# Patient Record
Sex: FEMALE | Race: WHITE
[De-identification: names, ages, dates, MRNs, and addresses within clinical notes are randomized per-mention and may not be internally consistent; named-entity substitution may affect disease eponyms.]

---

## 2020-01-24 ENCOUNTER — HOSPITAL ENCOUNTER (OUTPATIENT)
Dept: HOSPITAL 47 - RADCTMAIN | Age: 65
Discharge: HOME | End: 2020-01-24
Attending: INTERNAL MEDICINE
Payer: COMMERCIAL

## 2020-01-24 DIAGNOSIS — R91.1: Primary | ICD-10-CM

## 2020-01-24 DIAGNOSIS — K57.90: ICD-10-CM

## 2020-01-24 DIAGNOSIS — C54.9: ICD-10-CM

## 2020-01-24 PROCEDURE — 74177 CT ABD & PELVIS W/CONTRAST: CPT

## 2020-01-24 PROCEDURE — 71260 CT THORAX DX C+: CPT

## 2020-01-24 NOTE — CT
EXAMINATION TYPE: CT ChestAbdPelvis w con

 

DATE OF EXAM: 1/24/2020

 

INDICATION: uterine ca

 

COMPARISON: None at this location.

 

CT DLP: 1130.4 mGycm

 

CONTRAST: 

Performed with Oral Contrast and with IV Contrast, patient injected with 100 mL of Isovue 300.

 

TECHNIQUE: Axial images at 5 mm thick sections.  Reconstructed images in the coronal plane.  Delayed 
images through the kidneys.  

 

FINDINGS:

 

CT CHEST: Very top of the lung apices are out of the field-of-view.

 

Portion of the thyroid visualized is normal.

 

There is a 0.3 cm nodule in the periphery of the lingula. Series 5 image 46. No suspicious nodules ar
e identified.

 

No enlarged mediastinal or hilar adenopathy is evident. 

 

The ascending aorta diameter at the level of the main pulmonary artery is 3.2 cm.  The main pulmonary
 artery diameter at the bifurcation is 2.3 cm.

 

CT ABDOMEN:

 

Liver: Normal

 

Spleen: Normal

 

Pancreas: Normal

 

Adrenal glands: The adrenal glands are normal.

 

Gallbladder: Normal  

 

Kidneys: No masses are evident. No hydronephrosis is present.   No cysts are present.  Delayed images
 were obtained through the kidneys, which remain unremarkable.

 

Aorta: Vascular calcification is within the aorta. 

 

Inferior vena cava: Normal.

 

CT PELVIS: 

Loops of bowel within the abdomen and pelvis are normal.  There are some diverticular changes within 
the sigmoid colon. No acute diverticulitis is evident.   There are loops of bowel which are incomplet
ely distended or lack oral contrast limiting their evaluation.

 

Appendix: Normal as visualized.

 

Urinary bladder: Normal. 

 

Genitourinary structures: Uterus and ovaries are absent. Very minimal fluid may be within the right h
emipelvis.

 

Osseous structures: No suspicious lytic or sclerotic lesions.

 

IMPRESSIONS:

1. Some very minimal fluid in the right hemipelvis is nonspecific.

2. 0.3 cm nonspecific punctate left lung base nodule.

3. Diverticulosis without acute diverticulitis

## 2020-10-02 ENCOUNTER — HOSPITAL ENCOUNTER (OUTPATIENT)
Dept: HOSPITAL 47 - RADCTMAIN | Age: 65
Discharge: HOME | End: 2020-10-02
Attending: INTERNAL MEDICINE
Payer: MEDICARE

## 2020-10-02 DIAGNOSIS — C54.9: Primary | ICD-10-CM

## 2020-10-02 DIAGNOSIS — C43.9: ICD-10-CM

## 2020-10-02 LAB — BUN SERPL-SCNC: 16 MG/DL (ref 7–17)

## 2020-10-02 PROCEDURE — 84520 ASSAY OF UREA NITROGEN: CPT

## 2020-10-02 PROCEDURE — 82565 ASSAY OF CREATININE: CPT

## 2020-10-02 PROCEDURE — 74177 CT ABD & PELVIS W/CONTRAST: CPT

## 2020-10-02 PROCEDURE — 71260 CT THORAX DX C+: CPT

## 2020-10-02 PROCEDURE — 36415 COLL VENOUS BLD VENIPUNCTURE: CPT

## 2020-10-02 NOTE — CT
EXAMINATION TYPE: CT ChestAbdPelvis w con

 

DATE OF EXAM: 10/2/2020

 

COMPARISON: CT chest abdomen and pelvis January 24, 2020

 

HISTORY: Uterine cancer and melanoma. Patient completed chemotherapy of February 2020 and radiation t
reatment April 2020 after hysterectomy.

 

CT DLP: 694.8 mGycm. Automated Exposure Control for Dose Reduction was Utilized.

 

CONTRAST: 

CT scan of the thorax, abdomen and pelvis is performed with oral and with IV Contrast, patient inject
ed with 100 mL of Isovue 300.

 

FINDINGS:

 

LUNGS: The lungs are grossly clear, there is no suspicious new or enlarging greater than 4 mm pulmona
ry nodules identified.   There is no pleural effusion or pneumothorax seen bilaterally.  The tracheob
ronchial tree is patent.

 

MEDIASTINUM: There are no new greater than 1 cm hilar or mediastinal lymph nodes.   No pericardial ef
fusion is seen. Stable mild cardiomegaly. Coronary artery calcification redemonstrated which is noted
 marker for underlying coronary artery disease.

 

LIVER/GB: Low dense intraluminal gallstones in gallbladder redemonstrated.

 

PANCREAS: No significant abnormality is seen.

 

SPLEEN: No significant abnormality is seen.

 

ADRENALS: No significant abnormality is seen.

 

KIDNEYS: No significant abnormality is seen.

 

BOWEL: Oral contrast does not reach colonic level making evaluation of distal bowel slightly suboptim
al. No suspicious small or large bowel dilatation. Mild to moderate wall thickening with diverticular
 level sigmoid colon. No significant fat stranding to suggest acute diverticulitis. Presumed prior to
 poor distention, mild colitis cannot entirely excluded. Correlate clinically.

 

GENITAL ORGANS: The uterus is surgically absent . Scattered pelvic phleboliths.

 

LYMPH NODES: No greater than 1cm abdominal or pelvic lymph nodes are appreciated.

 

OSSEOUS STRUCTURES: S-shaped scoliotic curvature. Moderate disc space narrowing L5-S1 level. Slight g
rade 1 anterolisthesis L4 on L5.

 

OTHER: No significant additional abnormality is seen.

 

IMPRESSION: No suspicious new mass or adenopathy to suggest neoplastic recurrence.

## 2021-03-19 ENCOUNTER — HOSPITAL ENCOUNTER (OUTPATIENT)
Dept: HOSPITAL 47 - RADMAMWWP | Age: 66
Discharge: HOME | End: 2021-03-19
Attending: FAMILY MEDICINE
Payer: MEDICARE

## 2021-03-19 DIAGNOSIS — Z78.0: ICD-10-CM

## 2021-03-19 DIAGNOSIS — Z80.3: ICD-10-CM

## 2021-03-19 DIAGNOSIS — Z12.31: Primary | ICD-10-CM

## 2021-03-19 DIAGNOSIS — M85.89: ICD-10-CM

## 2021-03-19 PROCEDURE — 77080 DXA BONE DENSITY AXIAL: CPT

## 2021-03-19 PROCEDURE — 77067 SCR MAMMO BI INCL CAD: CPT

## 2021-03-19 PROCEDURE — 77063 BREAST TOMOSYNTHESIS BI: CPT

## 2021-03-22 NOTE — BD
EXAMINATION TYPE: Axial Bone Density

 

DATE OF EXAM: 3/19/2021

 

COMPARISON: NONE

 

CLINICAL HISTORY:

 

Height:  62.5 IN

Weight:  160 LBS

 

 

RISK FACTORS 

HISTORY OF: 

Active: YES

Postmenopausal woman: TOTAL HYST AGE 64

 

MEDICATIONS: 

Additional Medications: CALCIUM, VIT D, BLOOD PRESSURE MEDS, BLADDER MEDS,  

 

 

Additional History: UTERINE CANCER WITH CHEMO AND RADIATION

 

 

EXAM MEASUREMENTS: 

Bone mineral densitometry was performed using the BView System.

Bone mineral density as measured about the Lumbar spine is:  

----- L1-L4(G/cm2): 1.003

T Score Values are as follows:

----- L2: -2.5

----- L3: -0.8

----- L4: -1.0

----- L1-L4: -1.5

Bone mineral density BASELINE

 

Bone mineral density about the R hip (g/cm2): 0.791

Bone mineral density about the L hip (g/cm2): 0.768

T Score values are as follows:

-----R Neck: -1.8

-----L Neck: -1.9

-----R Total: -1.0

-----L Total: -1.3

Bone mineral density BASELINE

 

 

IMPRESSION:

Osteopenia

 

NOTE:  T-SCORE=SD OF THE YOUNG ADULT MEAN.

## 2021-03-23 NOTE — MM
Reason for exam: screening  (asymptomatic).

Last mammogram was performed 2 years ago.



History:

Patient is postmenopausal and history of other cancer.

Family history of breast cancer in aunt.



Physical Findings:

A clinical breast exam by your physician is recommended on an annual basis and 

results should be correlated with mammographic findings.



MG 3D Screening Mammo W/Cad

Bilateral CC and MLO view(s) were taken.

Prior study comparison: March 12, 2019, mammogram.  November 15, 2017, mammogram.

There are scattered fibroglandular densities.  No significant changes when 

compared with prior studies.





ASSESSMENT: Benign, BI-RAD 2



RECOMMENDATION:

Routine screening mammogram of both breasts in 1 year.

## 2021-08-20 ENCOUNTER — HOSPITAL ENCOUNTER (OUTPATIENT)
Dept: HOSPITAL 47 - RADPETMAIN | Age: 66
Discharge: HOME | End: 2021-08-20
Attending: RADIOLOGY
Payer: MEDICARE

## 2021-08-20 DIAGNOSIS — K57.30: ICD-10-CM

## 2021-08-20 DIAGNOSIS — Z85.42: ICD-10-CM

## 2021-08-20 DIAGNOSIS — K80.20: ICD-10-CM

## 2021-08-20 DIAGNOSIS — I25.10: Primary | ICD-10-CM

## 2021-08-20 PROCEDURE — 78815 PET IMAGE W/CT SKULL-THIGH: CPT

## 2021-08-20 NOTE — PE
EXAMINATION TYPE: PET CT fusion skull to thigh

 

DATE OF EXAM: 8/20/2021

 

COMPARISON: Prior whole body CT October 2, 2020

 

HISTORY: Endometrial cancer progress study   diagnosed biopsy December 5 with subsequent surgical exc
ision December 26, 2029. Completed chemotherapy and radiation treatment by July 2020.

 

TECHNIQUE:  Following the intravenous administration of 9.87 mCi of F-18 FDG, whole body images are p
erformed from the skull base to the midthigh.  Images are reviewed on the computer in the coronal, ax
ial, and sagittal planes.  Reconstructed rotating images are created on independent workstation and r
eviewed on the computer.   A localization and attenuation correction CT is performed in conjunction w
ith the PET scan.

 

SCAN: Subsequent Scan

 

FINDINGS: 

 

SKULL BASE AND NECK:  No areas of abnormal hypermetabolic uptake.

 

CHEST, MEDIASTINUM, AND HILAR REGION: No areas of abnormal hypermetabolic uptake

 

ABDOMEN AND PELVIS: Normal excretion in the bilateral kidneys. Uterus surgically absent. No suspiciou
s hypermetabolic uptake.

 

OSSEOUS STRUCTURES: No suspicious focal increased hypermetabolic uptake.

 

OTHER CT: Mild cardiomegaly with coronary artery calcification the proximal LAD distribution is redem
onstrated.

 

Dependent low dense gallstones again seen. Occasional diverticula in sigmoid colon redemonstrated. Di
sc space narrowing lumbosacral junction again seen.

 

IMPRESSION: No new suspicious hypermetabolic uptake to suggest recurrent active neoplasm.

## 2022-02-08 ENCOUNTER — HOSPITAL ENCOUNTER (OUTPATIENT)
Dept: HOSPITAL 47 - RADCTMAIN | Age: 67
Discharge: HOME | End: 2022-02-08
Attending: RADIOLOGY
Payer: MEDICARE

## 2022-02-08 DIAGNOSIS — Z90.722: ICD-10-CM

## 2022-02-08 DIAGNOSIS — C77.9: Primary | ICD-10-CM

## 2022-02-08 DIAGNOSIS — Z92.3: ICD-10-CM

## 2022-02-08 DIAGNOSIS — C54.1: ICD-10-CM

## 2022-02-08 LAB — BUN SERPL-SCNC: 16 MG/DL (ref 7–17)

## 2022-02-08 PROCEDURE — 84520 ASSAY OF UREA NITROGEN: CPT

## 2022-02-08 PROCEDURE — 74178 CT ABD&PLV WO CNTR FLWD CNTR: CPT

## 2022-02-08 PROCEDURE — 36415 COLL VENOUS BLD VENIPUNCTURE: CPT

## 2022-02-08 PROCEDURE — 71270 CT THORAX DX C-/C+: CPT

## 2022-02-08 PROCEDURE — 82565 ASSAY OF CREATININE: CPT

## 2022-02-08 NOTE — CT
EXAMINATION TYPE: CT ChestAbdPelvis wo/w con

 

DATE OF EXAM: 2/8/2022

 

COMPARISON: Prior PET/CT August 20, 2021 and older CTs

 

HISTORY: Endometrial Cancer diagnosed on biopsy December 5, 2019 with subsequent hysterectomy Decembe
r 26. Progress study after chemotherapy and radiation treatment.

 

CT DLP: 1339.70 mGycm. Automated Exposure Control for Dose Reduction was Utilized.

 

CONTRAST: 

CT scan of the thorax, abdomen and pelvis is performed with oral and without and with IV Contrast, pa
tient injected with 100 ml mL of Isovue 300.

 

FINDINGS:

 

LUNGS: The lungs remain grossly clear, there is no suspicious new or enlarging greater than 5 mm pulm
onary nodules identified.   There is no pleural effusion or pneumothorax seen bilaterally.  The trach
eobronchial tree is patent.

 

MEDIASTINUM: There are no new greater than 1 cm hilar or mediastinal lymph nodes.   No pericardial ef
fusion is seen. Heart size upper limits of normal. Mild Coronary artery calcification is redemonstrat
ed proximal LAD.

 

LIVER/GB: Low dense intraluminal gallstones in gallbladder redemonstrated. No new biliary dilatation.


 

PANCREAS: No significant abnormality is seen.

 

SPLEEN: No significant abnormality is seen.

 

ADRENALS: No significant abnormality is seen.

 

KIDNEYS: Small simple appearing peripelvic cyst centrally in the left kidney are incidentally noted.

 

BOWEL: Oral contrast reaches level of the distal left colon. No suspicious small or large bowel dilat
ation. Mild Diverticular change in the sigmoid colon with mild colonic fecal prominence. No significa
nt fat stranding to suggest acute diverticulitis. Normal contrast-filled appendix incidentally noted.


 

GENITAL ORGANS: The uterus is surgically absent . A few scattered tiny sided pelvic phleboliths.

 

LYMPH NODES: No new greater than 1cm abdominal or pelvic lymph nodes are appreciated.

 

OSSEOUS STRUCTURES: S-shaped scoliotic curvature redemonstrated. Moderate disc space narrowing L5-S1 
level. Slight grade 1 anterolisthesis L4 on L5.

 

OTHER: No significant additional abnormality is seen.

 

IMPRESSION: No suspicious new mass or adenopathy to suggest neoplastic recurrence.

## 2023-03-26 ENCOUNTER — HOSPITAL ENCOUNTER (EMERGENCY)
Dept: HOSPITAL 47 - EC | Age: 68
Discharge: HOME | End: 2023-03-26
Payer: MEDICARE

## 2023-03-26 VITALS — DIASTOLIC BLOOD PRESSURE: 70 MMHG | SYSTOLIC BLOOD PRESSURE: 130 MMHG | HEART RATE: 68 BPM | TEMPERATURE: 97.2 F

## 2023-03-26 VITALS — RESPIRATION RATE: 20 BRPM

## 2023-03-26 DIAGNOSIS — K80.20: Primary | ICD-10-CM

## 2023-03-26 LAB
ALBUMIN SERPL-MCNC: 4.3 G/DL (ref 3.5–5)
ALP SERPL-CCNC: 95 U/L (ref 38–126)
ALT SERPL-CCNC: 14 U/L (ref 4–34)
ANION GAP SERPL CALC-SCNC: 8 MMOL/L
AST SERPL-CCNC: 21 U/L (ref 14–36)
BASOPHILS # BLD AUTO: 0 K/UL (ref 0–0.2)
BASOPHILS NFR BLD AUTO: 1 %
BUN SERPL-SCNC: 17 MG/DL (ref 7–17)
CALCIUM SPEC-MCNC: 9.7 MG/DL (ref 8.4–10.2)
CHLORIDE SERPL-SCNC: 104 MMOL/L (ref 98–107)
CO2 SERPL-SCNC: 25 MMOL/L (ref 22–30)
EOSINOPHIL # BLD AUTO: 0.2 K/UL (ref 0–0.7)
EOSINOPHIL NFR BLD AUTO: 4 %
ERYTHROCYTE [DISTWIDTH] IN BLOOD BY AUTOMATED COUNT: 4.26 M/UL (ref 3.8–5.4)
ERYTHROCYTE [DISTWIDTH] IN BLOOD: 13 % (ref 11.5–15.5)
GLUCOSE SERPL-MCNC: 83 MG/DL (ref 74–99)
HCT VFR BLD AUTO: 40.6 % (ref 34–46)
HGB BLD-MCNC: 13.8 GM/DL (ref 11.4–16)
LIPASE SERPL-CCNC: 33 U/L (ref 23–300)
LYMPHOCYTES # SPEC AUTO: 0.8 K/UL (ref 1–4.8)
LYMPHOCYTES NFR SPEC AUTO: 13 %
MCH RBC QN AUTO: 32.3 PG (ref 25–35)
MCHC RBC AUTO-ENTMCNC: 33.9 G/DL (ref 31–37)
MCV RBC AUTO: 95.3 FL (ref 80–100)
MONOCYTES # BLD AUTO: 0.3 K/UL (ref 0–1)
MONOCYTES NFR BLD AUTO: 5 %
NEUTROPHILS # BLD AUTO: 4.4 K/UL (ref 1.3–7.7)
NEUTROPHILS NFR BLD AUTO: 75 %
PH UR: 6.5 [PH] (ref 5–8)
PLATELET # BLD AUTO: 222 K/UL (ref 150–450)
POTASSIUM SERPL-SCNC: 4.2 MMOL/L (ref 3.5–5.1)
PROT SERPL-MCNC: 6.9 G/DL (ref 6.3–8.2)
SODIUM SERPL-SCNC: 137 MMOL/L (ref 137–145)
SP GR UR: 1.02 (ref 1–1.03)
UROBILINOGEN UR QL STRIP: <2 MG/DL (ref ?–2)
WBC # BLD AUTO: 5.9 K/UL (ref 3.8–10.6)

## 2023-03-26 PROCEDURE — 36415 COLL VENOUS BLD VENIPUNCTURE: CPT

## 2023-03-26 PROCEDURE — 99284 EMERGENCY DEPT VISIT MOD MDM: CPT

## 2023-03-26 PROCEDURE — 81003 URINALYSIS AUTO W/O SCOPE: CPT

## 2023-03-26 PROCEDURE — 85025 COMPLETE CBC W/AUTO DIFF WBC: CPT

## 2023-03-26 PROCEDURE — 80053 COMPREHEN METABOLIC PANEL: CPT

## 2023-03-26 PROCEDURE — 96375 TX/PRO/DX INJ NEW DRUG ADDON: CPT

## 2023-03-26 PROCEDURE — 96361 HYDRATE IV INFUSION ADD-ON: CPT

## 2023-03-26 PROCEDURE — 83605 ASSAY OF LACTIC ACID: CPT

## 2023-03-26 PROCEDURE — 83690 ASSAY OF LIPASE: CPT

## 2023-03-26 PROCEDURE — 74176 CT ABD & PELVIS W/O CONTRAST: CPT

## 2023-03-26 PROCEDURE — 96374 THER/PROPH/DIAG INJ IV PUSH: CPT

## 2023-03-26 NOTE — ED
Abdominal Pain HPI





- General


Chief Complaint: Abdominal Pain


Stated Complaint: Abd Pain,Nausea


Time Seen by Provider: 03/26/23 13:55


Source: patient


Mode of arrival: ambulatory


Limitations: no limitations





- History of Present Illness


Initial Comments: 


Patient is a 68-year-old female who presents to the emergency department for 

abdominal pain.  Patient reports intermittent pain in her right lower abdomen 

for the past 2 weeks.  Patient states it feels like a pulling and aching.  There

is no radiation.  Patient has nausea without vomiting.  Denies fever, chills, 

constipation, diarrhea, burning with urination, blood in the urine.  Patient 

states she went to [facility] last week for this concern and had a negative workup

including labs and CT.  Patient is status post hysterectomy and bilateral 

salpingo-oophorectomy due to uterine cancer.  She has been in remission since 

2020.  Patient also has history of gallstones states she has not have pain in in

her gallbladder region








- Related Data


                                  Previous Rx's











 Medication  Instructions  Recorded


 


Ondansetron Odt [Zofran Odt] 4 mg PO Q8HR PRN #10 tab 03/26/23











                                    Allergies











Allergy/AdvReac Type Severity Reaction Status Date / Time


 


No Known Allergies Allergy   Verified 03/26/23 13:49














Review of Systems


ROS Statement: 


Those systems with pertinent positive or pertinent negative responses have been 

documented in the HPI.





ROS Other: All systems not noted in ROS Statement are negative.





Past Medical History


Past Medical History: Cancer


Additional Past Medical History / Comment(s): uterine cancer


History of Any Multi-Drug Resistant Organisms: None Reported


Past Surgical History: Hysterectomy


Past Psychological History: No Psychological Hx Reported


Smoking Status: Never smoker


Past Alcohol Use History: None Reported


Past Drug Use History: None Reported





General Exam


Limitations: no limitations


General appearance: alert, in no apparent distress


Head exam: Present: atraumatic, normocephalic, normal inspection


Respiratory exam: Present: normal lung sounds bilaterally.  Absent: respiratory 

distress, wheezes, rales, rhonchi, stridor


Cardiovascular Exam: Present: regular rate, normal rhythm, normal heart sounds. 

Absent: systolic murmur, diastolic murmur, rubs, gallop, clicks


GI/Abdominal exam: Present: soft, normal bowel sounds.  Absent: distended, 

tenderness, guarding, rebound, rigid


Neurological exam: Present: alert, oriented X3, CN II-XII intact


Psychiatric exam: Present: normal affect, normal mood


Skin exam: Present: warm, dry, intact, normal color.  Absent: rash





Course


                                   Vital Signs











  03/26/23 03/26/23 03/26/23





  13:45 15:00 16:00


 


Temperature 98.3 F  97.2 F L


 


Pulse Rate 64 67 68


 


Respiratory 16 20 20





Rate   


 


Blood Pressure 134/70 132/68 130/70


 


O2 Sat by Pulse 100 100 99





Oximetry   














Medical Decision Making





- Medical Decision Making


Was pt. sent in by a medical professional or institution (, PA, NP, urgent 

care, hospital, or nursing home...) When possible be specific


@  -No


Did you speak to anyone other than the patient for history (EMS, parent, family,

police, friend...)? What history was obtained from this source 


@  -No


Did you review nursing and triage notes (agree or disagree)?  Why? 


@  -I reviewed and agree with nursing and triage notes


Were old charts reviewed (outside hosp., previous admission, EMS record, old 

EKG, old radiological studies, urgent care reports/EKG's, nursing home records)?

Report findings 


@  -No old charts were reviewed


Differential Diagnosis (chest pain, altered mental status, abdominal pain women,

abdominal pain men, vaginal bleeding, weakness, fever, dyspnea, syncope, headach

e, dizziness, GI bleed, back pain, seizure, CVA, palpatations, mental health)? 


@  -Differential Abdominal Pain Women:


Appendicitis, Cholecystitis, diverticulosis, ischemic bowel, pancreatitis, 

hepatitis, UTI, gastroenteritis, AAA, incarcerated hernia, bowel obstruction, 

constipation, inflammatory bowel, hepatitis, peptic ulcer disease, splenic 

infarction, perforated viscus, vulvitis, ovarian torsion, PID, kidney stone, 

placenta abruption, this is not meant to be an all-inclusive list


EKG interpreted by me (3pts min.).


@  -As above


X-rays interpreted by me (1pt min.).


@  -None done


CT interpreted by me (1pt min.).


@  -Yes, CT of the abdomen and pelvis without contrast shows multiple gallstones

no dilated ducts normal appendix


U/S interpreted by me (1pt. min.).


@  -None done


What testing was considered but not performed or refused? (CT, X-rays, U/S, 

labs)? Why?


@  -None


What meds were considered but not given or refused? Why?


@  -None


Did you discuss the management of the patient with other professionals 

(professionals i.e. , PA, NP, lab, RT, psych nurse, , , 

teacher, , )? Give summary


@  -No


Was smoking cessation discussed for >3mins.?


@  -No


Was critical care preformed (if so, how long)?


@  -No


Were there social determinants of health that impacted care today? How? 

(Homelessness, low income, unemployed, alcoholism, drug addiction, 

transportation, low edu. Level, literacy, decrease access to med. care, residential, 

rehab)?


@  -No


Was there de-escalation of care discussed even if they declined (Discuss DNR or 

withdrawal of care, Hospice)? DNR status


@  -No


What co-morbidities impacted this encounter? (DM, HTN, Smoking, COPD, CAD, 

Cancer, CVA, ARF, Chemo, Hep., AIDS, mental health diagnosis, sleep apnea, 

morbid obesity)?


@  -None


Was patient admitted / discharged? Hospital course, mention meds given and 

route, prescriptions, significant lab abnormalities, going to OR and other 

pertinent info.


@  -Patient presenting for intermittent pain in the right lower abdomen.  No 

fever or vomiting.  The abdomen is soft and nontender.  Laboratory studies were 

obtained and are unremarkable.  CT of the abdomen and pelvis without contrast 

shows multiple gallstones which patient is aware of, normal appendix no other 

evidence of acute process.  Patient treated in the emergency department she is 

feeling improved.  She will need to follow up with her primary care provider.


Undiagnosed new problem with uncertain prognosis?


@  -No


Drug Therapy requiring intensive monitoring for toxicity (Heparin, Nitro, 

Insulin, Cardizem)?


@  -No


Were any procedures done?


@  -No


Diagnosis/symptom?


@  -abdominal pain


Acute, or Chronic, or Acute on Chronic?


@  -acute


Uncomplicated (without systemic symptoms) or Complicated (systemic symptoms)?


@  -uncomplicated 


Side effects of treatment?


@  -No


Exacerbation, Progression, or Severe Exacerbation?


@  -No


Poses a threat to life or bodily function? How? (Chest pain, USA, MI, pneumonia,

PE, COPD, DKA, ARF, appy, cholecystitis, CVA, Diverticulitis, Homicidal, 

Suicidal, threat to staff... and all critical care pts)


@  -No








Dr. Montoya is my attending 





- Lab Data


Result diagrams: 


                                 03/26/23 14:34





                                 03/26/23 14:34


                                   Lab Results











  03/26/23 03/26/23 03/26/23 Range/Units





  14:19 14:34 14:34 


 


WBC   5.9   (3.8-10.6)  k/uL


 


RBC   4.26   (3.80-5.40)  m/uL


 


Hgb   13.8   (11.4-16.0)  gm/dL


 


Hct   40.6   (34.0-46.0)  %


 


MCV   95.3   (80.0-100.0)  fL


 


MCH   32.3   (25.0-35.0)  pg


 


MCHC   33.9   (31.0-37.0)  g/dL


 


RDW   13.0   (11.5-15.5)  %


 


Plt Count   222   (150-450)  k/uL


 


MPV   8.7   


 


Neutrophils %   75   %


 


Lymphocytes %   13   %


 


Monocytes %   5   %


 


Eosinophils %   4   %


 


Basophils %   1   %


 


Neutrophils #   4.4   (1.3-7.7)  k/uL


 


Lymphocytes #   0.8 L   (1.0-4.8)  k/uL


 


Monocytes #   0.3   (0-1.0)  k/uL


 


Eosinophils #   0.2   (0-0.7)  k/uL


 


Basophils #   0.0   (0-0.2)  k/uL


 


Sodium    137  (137-145)  mmol/L


 


Potassium    4.2  (3.5-5.1)  mmol/L


 


Chloride    104  ()  mmol/L


 


Carbon Dioxide    25  (22-30)  mmol/L


 


Anion Gap    8  mmol/L


 


BUN    17  (7-17)  mg/dL


 


Creatinine    0.99  (0.52-1.04)  mg/dL


 


Est GFR (CKD-EPI)AfAm    68  (>60 ml/min/1.73 sqM)  


 


Est GFR (CKD-EPI)NonAf    59  (>60 ml/min/1.73 sqM)  


 


Glucose    83  (74-99)  mg/dL


 


Plasma Lactic Acid Ivan     (0.7-2.0)  mmol/L


 


Calcium    9.7  (8.4-10.2)  mg/dL


 


Total Bilirubin    0.5  (0.2-1.3)  mg/dL


 


AST    21  (14-36)  U/L


 


ALT    14  (4-34)  U/L


 


Alkaline Phosphatase    95  ()  U/L


 


Total Protein    6.9  (6.3-8.2)  g/dL


 


Albumin    4.3  (3.5-5.0)  g/dL


 


Lipase    33  ()  U/L


 


Urine Color  Light Yellow    


 


Urine Appearance  Clear    (Clear)  


 


Urine pH  6.5    (5.0-8.0)  


 


Ur Specific Gravity  1.019    (1.001-1.035)  


 


Urine Protein  Negative    (Negative)  


 


Urine Glucose (UA)  Negative    (Negative)  


 


Urine Ketones  Negative    (Negative)  


 


Urine Blood  Negative    (Negative)  


 


Urine Nitrite  Negative    (Negative)  


 


Urine Bilirubin  Negative    (Negative)  


 


Urine Urobilinogen  <2.0    (<2.0)  mg/dL


 


Ur Leukocyte Esterase  Negative    (Negative)  














  03/26/23 Range/Units





  14:34 


 


WBC   (3.8-10.6)  k/uL


 


RBC   (3.80-5.40)  m/uL


 


Hgb   (11.4-16.0)  gm/dL


 


Hct   (34.0-46.0)  %


 


MCV   (80.0-100.0)  fL


 


MCH   (25.0-35.0)  pg


 


MCHC   (31.0-37.0)  g/dL


 


RDW   (11.5-15.5)  %


 


Plt Count   (150-450)  k/uL


 


MPV   


 


Neutrophils %   %


 


Lymphocytes %   %


 


Monocytes %   %


 


Eosinophils %   %


 


Basophils %   %


 


Neutrophils #   (1.3-7.7)  k/uL


 


Lymphocytes #   (1.0-4.8)  k/uL


 


Monocytes #   (0-1.0)  k/uL


 


Eosinophils #   (0-0.7)  k/uL


 


Basophils #   (0-0.2)  k/uL


 


Sodium   (137-145)  mmol/L


 


Potassium   (3.5-5.1)  mmol/L


 


Chloride   ()  mmol/L


 


Carbon Dioxide   (22-30)  mmol/L


 


Anion Gap   mmol/L


 


BUN   (7-17)  mg/dL


 


Creatinine   (0.52-1.04)  mg/dL


 


Est GFR (CKD-EPI)AfAm   (>60 ml/min/1.73 sqM)  


 


Est GFR (CKD-EPI)NonAf   (>60 ml/min/1.73 sqM)  


 


Glucose   (74-99)  mg/dL


 


Plasma Lactic Acid Ivan  1.2  (0.7-2.0)  mmol/L


 


Calcium   (8.4-10.2)  mg/dL


 


Total Bilirubin   (0.2-1.3)  mg/dL


 


AST   (14-36)  U/L


 


ALT   (4-34)  U/L


 


Alkaline Phosphatase   ()  U/L


 


Total Protein   (6.3-8.2)  g/dL


 


Albumin   (3.5-5.0)  g/dL


 


Lipase   ()  U/L


 


Urine Color   


 


Urine Appearance   (Clear)  


 


Urine pH   (5.0-8.0)  


 


Ur Specific Gravity   (1.001-1.035)  


 


Urine Protein   (Negative)  


 


Urine Glucose (UA)   (Negative)  


 


Urine Ketones   (Negative)  


 


Urine Blood   (Negative)  


 


Urine Nitrite   (Negative)  


 


Urine Bilirubin   (Negative)  


 


Urine Urobilinogen   (<2.0)  mg/dL


 


Ur Leukocyte Esterase   (Negative)  














Disposition


Clinical Impression: 


 RLQ abdominal pain





Disposition: HOME SELF-CARE


Condition: Good


Instructions (If sedation given, give patient instructions):  Abdominal Pain 

(ED)


Additional Instructions: 


Take Tylenol for pain.  Take Zofran as directed for nausea.  Follow-up with 

primary care provider in one to 2 days.  Please return to the emergency 

department if you experience new, concerning, or worsening symptoms.


Prescriptions: 


Ondansetron Odt [Zofran Odt] 4 mg PO Q8HR PRN #10 tab


 PRN Reason: Nausea


Is patient prescribed a controlled substance at d/c from ED?: No


Referrals: 


Luis Mcconnell DO [Primary Care Provider] - 1-2 days

## 2023-03-26 NOTE — CT
EXAMINATION TYPE: CT abdomen pelvis wo con

 

DATE OF EXAM: 3/26/2023

 

COMPARISON: 8/27/2022

 

HISTORY: Rt sided abdominal and flank pain.

 

CT DLP: 590.1 mGycm

Automated exposure control for dose reduction was used.

 

Images obtained from the diaphragm to the floor the pelvis without contrast.

 

Lung bases are clear of consolidation. No pleural effusion. There is minimal subsegmental atelectasis
 at the left lung base. Heart size is fairly normal. No pericardial effusion.

 

Liver and spleen are intact. There are multiple cholesterol gallstones. The bile ducts are not dilate
d. No pancreatic mass. The stomach is intact.

 

There is no adrenal mass. There are left-sided renal parapelvic cysts. No hydronephrosis. The ureters
 are not dilated. No retroperitoneal adenopathy. The bladder distends smoothly. No inguinal hernia. T
here are a few sigmoid diverticula. No diverticulitis. The appendix is posterior and appears normal. 
There is no mesenteric edema. No ascites or free air. No sign of a bowel obstruction.

 

The lumbar vertebrae have normal alignment. Posterior elements are intact. No compression fracture. T
here is disc space mild narrowing at L5-S1. The bony pelvis is intact. Hip joints are intact.

 

IMPRESSION:

Multiple gallstones. No dilated ducts. No adverse change compared to old exam. Normal appendix.